# Patient Record
Sex: MALE | Race: WHITE | NOT HISPANIC OR LATINO | Employment: OTHER | URBAN - METROPOLITAN AREA
[De-identification: names, ages, dates, MRNs, and addresses within clinical notes are randomized per-mention and may not be internally consistent; named-entity substitution may affect disease eponyms.]

---

## 2018-01-22 ENCOUNTER — HOSPITAL ENCOUNTER (EMERGENCY)
Facility: MEDICAL CENTER | Age: 68
End: 2018-01-22
Attending: EMERGENCY MEDICINE

## 2018-01-22 ENCOUNTER — APPOINTMENT (OUTPATIENT)
Dept: RADIOLOGY | Facility: MEDICAL CENTER | Age: 68
End: 2018-01-22
Attending: EMERGENCY MEDICINE

## 2018-01-22 VITALS
OXYGEN SATURATION: 95 % | HEART RATE: 86 BPM | HEIGHT: 68 IN | WEIGHT: 182.98 LBS | DIASTOLIC BLOOD PRESSURE: 75 MMHG | SYSTOLIC BLOOD PRESSURE: 154 MMHG | TEMPERATURE: 99.1 F | RESPIRATION RATE: 17 BRPM | BODY MASS INDEX: 27.73 KG/M2

## 2018-01-22 DIAGNOSIS — J40 BRONCHITIS: ICD-10-CM

## 2018-01-22 LAB
ANION GAP SERPL CALC-SCNC: 12 MMOL/L (ref 0–11.9)
BASOPHILS # BLD AUTO: 0.6 % (ref 0–1.8)
BASOPHILS # BLD: 0.05 K/UL (ref 0–0.12)
BUN SERPL-MCNC: 21 MG/DL (ref 8–22)
CALCIUM SERPL-MCNC: 9.3 MG/DL (ref 8.4–10.2)
CHLORIDE SERPL-SCNC: 99 MMOL/L (ref 96–112)
CO2 SERPL-SCNC: 24 MMOL/L (ref 20–33)
CREAT SERPL-MCNC: 1.37 MG/DL (ref 0.5–1.4)
DEPRECATED D DIMER PPP IA-ACNC: <200 NG/ML(D-DU)
EKG IMPRESSION: NORMAL
EOSINOPHIL # BLD AUTO: 0.08 K/UL (ref 0–0.51)
EOSINOPHIL NFR BLD: 0.9 % (ref 0–6.9)
ERYTHROCYTE [DISTWIDTH] IN BLOOD BY AUTOMATED COUNT: 43.5 FL (ref 35.9–50)
GLUCOSE SERPL-MCNC: 179 MG/DL (ref 65–99)
HCT VFR BLD AUTO: 42.4 % (ref 42–52)
HGB BLD-MCNC: 14.4 G/DL (ref 14–18)
IMM GRANULOCYTES # BLD AUTO: 0.02 K/UL (ref 0–0.11)
IMM GRANULOCYTES NFR BLD AUTO: 0.2 % (ref 0–0.9)
LYMPHOCYTES # BLD AUTO: 0.61 K/UL (ref 1–4.8)
LYMPHOCYTES NFR BLD: 6.9 % (ref 22–41)
MCH RBC QN AUTO: 30.8 PG (ref 27–33)
MCHC RBC AUTO-ENTMCNC: 34 G/DL (ref 33.7–35.3)
MCV RBC AUTO: 90.8 FL (ref 81.4–97.8)
MONOCYTES # BLD AUTO: 0.86 K/UL (ref 0–0.85)
MONOCYTES NFR BLD AUTO: 9.8 % (ref 0–13.4)
NEUTROPHILS # BLD AUTO: 7.19 K/UL (ref 1.82–7.42)
NEUTROPHILS NFR BLD: 81.6 % (ref 44–72)
NRBC # BLD AUTO: 0 K/UL
NRBC BLD-RTO: 0 /100 WBC
PLATELET # BLD AUTO: 159 K/UL (ref 164–446)
PMV BLD AUTO: 12.6 FL (ref 9–12.9)
POTASSIUM SERPL-SCNC: 4.4 MMOL/L (ref 3.6–5.5)
RBC # BLD AUTO: 4.67 M/UL (ref 4.7–6.1)
SODIUM SERPL-SCNC: 135 MMOL/L (ref 135–145)
WBC # BLD AUTO: 8.8 K/UL (ref 4.8–10.8)

## 2018-01-22 PROCEDURE — 700102 HCHG RX REV CODE 250 W/ 637 OVERRIDE(OP): Performed by: EMERGENCY MEDICINE

## 2018-01-22 PROCEDURE — 85379 FIBRIN DEGRADATION QUANT: CPT

## 2018-01-22 PROCEDURE — 94640 AIRWAY INHALATION TREATMENT: CPT

## 2018-01-22 PROCEDURE — 99284 EMERGENCY DEPT VISIT MOD MDM: CPT

## 2018-01-22 PROCEDURE — 94760 N-INVAS EAR/PLS OXIMETRY 1: CPT

## 2018-01-22 PROCEDURE — A9270 NON-COVERED ITEM OR SERVICE: HCPCS | Performed by: EMERGENCY MEDICINE

## 2018-01-22 PROCEDURE — 85025 COMPLETE CBC W/AUTO DIFF WBC: CPT

## 2018-01-22 PROCEDURE — 36415 COLL VENOUS BLD VENIPUNCTURE: CPT

## 2018-01-22 PROCEDURE — 700101 HCHG RX REV CODE 250: Performed by: EMERGENCY MEDICINE

## 2018-01-22 PROCEDURE — 71046 X-RAY EXAM CHEST 2 VIEWS: CPT

## 2018-01-22 PROCEDURE — 80048 BASIC METABOLIC PNL TOTAL CA: CPT

## 2018-01-22 PROCEDURE — 93005 ELECTROCARDIOGRAM TRACING: CPT | Performed by: EMERGENCY MEDICINE

## 2018-01-22 RX ORDER — FENOFIBRATE 200 MG/1
200 CAPSULE ORAL
COMMUNITY

## 2018-01-22 RX ORDER — ROSUVASTATIN CALCIUM 10 MG/1
10 TABLET, COATED ORAL EVERY EVENING
COMMUNITY

## 2018-01-22 RX ORDER — DOXYCYCLINE 100 MG/1
100 CAPSULE ORAL DAILY
Qty: 7 CAP | Refills: 0 | Status: SHIPPED | OUTPATIENT
Start: 2018-01-22

## 2018-01-22 RX ORDER — RAMIPRIL 10 MG/1
10 CAPSULE ORAL DAILY
COMMUNITY

## 2018-01-22 RX ORDER — ATENOLOL 100 MG/1
100 TABLET ORAL DAILY
COMMUNITY

## 2018-01-22 RX ORDER — DOXYCYCLINE 100 MG/1
100 TABLET ORAL ONCE
Status: COMPLETED | OUTPATIENT
Start: 2018-01-22 | End: 2018-01-22

## 2018-01-22 RX ORDER — SENNOSIDES 8.6 MG
1300 CAPSULE ORAL 2 TIMES DAILY
COMMUNITY

## 2018-01-22 RX ORDER — M-VIT,TX,IRON,MINS/CALC/FOLIC 27MG-0.4MG
1 TABLET ORAL DAILY
COMMUNITY

## 2018-01-22 RX ORDER — ALBUTEROL SULFATE 90 UG/1
2 AEROSOL, METERED RESPIRATORY (INHALATION) EVERY 4 HOURS PRN
Qty: 1 INHALER | Refills: 0 | Status: SHIPPED | OUTPATIENT
Start: 2018-01-22

## 2018-01-22 RX ADMIN — DOXYCYCLINE 100 MG: 100 TABLET ORAL at 13:07

## 2018-01-22 RX ADMIN — ALBUTEROL SULFATE 2.5 MG: 2.5 SOLUTION RESPIRATORY (INHALATION) at 11:53

## 2018-01-22 ASSESSMENT — PAIN SCALES - GENERAL: PAINLEVEL_OUTOF10: 0

## 2018-01-22 NOTE — ED NOTES
"Med rec updated and complete  Allergie reviewed  Pt had a list of medications, went over list of medications and returned list of medications back to pt.  Pt states \"No antibiotics in the last 30 days\".  Pt states \"Not sure what pharmacy to go too\".    "

## 2018-01-22 NOTE — FLOWSHEET NOTE
01/22/18 1153   Events/Summary/Plan   Events/Summary/Plan Tx given in ER   Interdisciplinary Plan of Care-Goals (Indications)   Obstructive Ventilatory Defect or Pulmonary Disease without Obvious Obstruction Strong Subjective / Objective Improvement   Interdisciplinary Plan of Care-Outcomes    Bronchodilator Outcome Patient at Stable Baseline   Education   Education Yes - Pt. / Family has been Instructed in use of Respiratory Medications and Adverse Reactions   RT Assessment of Delivered Medications   Evaluation of Medication Delivery Daily Yes-- Pt /Family has been Instructed in use of Respiratory Medications and Adverse Reactions   SVN Group   #SVN Performed Yes   Given By: Mouthpiece   Date SVN Last Changed 01/22/18   Date SVN Next Change Due (Q 7 Days) 01/29/18   Respiratory WDL   Respiratory (WDL) X   Chest Exam   Work Of Breathing / Effort Mild   Respiration 16   Heart Rate (Monitored) 77   Breath Sounds   Pre/Post Intervention Pre Intervention Assessment   RUL Breath Sounds Clear   RML Breath Sounds Clear;Diminished   RLL Breath Sounds Diminished   SHANELLE Breath Sounds Clear   LLL Breath Sounds Diminished   Oximetry   #Pulse Oximetry (Single Determination) Yes   Oxygen   Home O2 Use Prior To Admission? No   Pulse Oximetry 94 %   O2 (LPM) 0   O2 (FiO2) 21   O2 Daily Delivery Respiratory  Room Air with O2 Available

## 2018-01-22 NOTE — ED PROVIDER NOTES
"ED Provider Note    CHIEF COMPLAINT(1/4)  Chief Complaint   Patient presents with   • Cough   • Congestion       HPI  Evelio Kimball is a 67 y.o. male who presents with 2 days of tightness in his chest onset 2 days ago.  Onset was insidious with concurrent decreased appetite.  He has had progressive cough with chest pain occurring with his cough.  He has not chest pain other than with cough.  He has not had shortness of breath.  He has been ambulatory for over one mile without any difficulty, increase in chest pressure, sob.  Today he began coughing brown sputum.  He has been driving [place] with recent drive from [place].  He denies any swelling in his extremities.  He denies any f/c.        REVIEW OF SYSTEMS(1/10)  Pertinent positives include: Cough with brown sputum.    Pertinent negatives include: shortness of breath, n/v, d/c, throat pain, rhinorrhea, epistaxis, pain with hernia, abdominal pain.   All other systems are negative.     PAST MEDICAL HISTORY(PFS1,2)  Past Medical History:   Diagnosis Date   • Diabetes (CMS-[facility])     type II       FAMILY HISTORY  None    SOCIAL HISTORY  Social History   Substance Use Topics   • Smoking status: Never Smoker   • Smokeless tobacco: Never Used   • Alcohol use Yes     History   Drug Use No       SURGICAL HISTORY  Past Surgical History:   Procedure Laterality Date   • OTHER      ear drum repair       CURRENT MEDICATIONS  Home Medications    **Home medications have not yet been reviewed for this encounter**         ALLERGIES  No Known Allergies    PHYSICAL EXAM  VITAL SIGNS: /75   Pulse 85   Temp 37.4 °C (99.3 °F)   Resp 16   Ht 1.727 m (5' 8\")   Wt 83 kg (182 lb 15.7 oz)   SpO2 96%   BMI 27.82 kg/m²  Reviewed.  Constitutional: Well developed, Well nourished, nontoxic, NAD  HENT: Normocephalic, atraumatic, bilateral external ears normal, oropharynx moist, No exudates or erythema.   Eyes: PERRL, conjunctiva pink, no scleral icterus.   Cardiovascular: RRR, " NMGR  Respiratory: Right mid rhonchi, no tachypnea or retractions.  Gastrointestinal: Soft, ND, small umbilical hernia which is easily reducible.  Skin: No erythema, no rash.   Genitourinary:  No costovertebral angle tenderness.   Neurologic: Alert & oriented x 3, cranial nerves 3-11 intact by passive exam.  No focal deficit noted.  Psychiatric: Affect normal, Judgment normal, Mood normal.     DIFFERENTIAL DIAGNOSIS:  Pulmonary embolism, influenza, pneumonia.      EKG  Normal sinus rhythm  Grossly normal axis  Mildly elevated QRS, qtc and pr normal  No ST changes  Interpretation nonspecific EKG nonspecific conduction delay    RADIOLOGY/PROCEDURES  Dx-chest-2 Views  1. No active cardiopulmonary abnormalities are identified.    LABORATORY:   Lab Results   Component Value Date/Time    WBC 8.8 01/22/2018 11:19 AM    RBC 4.67 (L) 01/22/2018 11:19 AM    HEMOGLOBIN 14.4 01/22/2018 11:19 AM    HEMATOCRIT 42.4 01/22/2018 11:19 AM    MCV 90.8 01/22/2018 11:19 AM    MCH 30.8 01/22/2018 11:19 AM    MCHC 34.0 01/22/2018 11:19 AM    MPV 12.6 01/22/2018 11:19 AM    NEUTSPOLYS 81.60 (H) 01/22/2018 11:19 AM    LYMPHOCYTES 6.90 (L) 01/22/2018 11:19 AM    MONOCYTES 9.80 01/22/2018 11:19 AM    EOSINOPHILS 0.90 01/22/2018 11:19 AM    BASOPHILS 0.60 01/22/2018 11:19 AM      Lab Results   Component Value Date/Time    SODIUM 135 01/22/2018 11:19 AM    POTASSIUM 4.4 01/22/2018 11:19 AM    CHLORIDE 99 01/22/2018 11:19 AM    CO2 24 01/22/2018 11:19 AM    GLUCOSE 179 (H) 01/22/2018 11:19 AM    BUN 21 01/22/2018 11:19 AM    CREATININE 1.37 01/22/2018 11:19 AM      D dimer- less than 200      COURSE & MEDICAL DECISION MAKING    1050 Evaluated.  Labs, EKG, xray ordered.  Nebulizer treatment ordered.    1245 All data available.  Tightness improved.  Reviewed with patient.  Rx for antibiotics and albuterol discussed.  Return precautions reviewed.  Will discharge with recommendations to f/u in 5 days with pcp.    Review nursing notes and vital signs  a final time 10:38 AM    PLAN:  Discharge home    CONDITION: Stable.    FINAL IMPRESSION  Bronchitis        I personally evaluated the patient myself. And did my own evaluation. I agree with the resident's note except for the following changes.    On my evaluation the patient had rhonchi in the right midlobe.   Patient was given breathing treatments with that bronchitis gone away. Patient does have a productive cough at this point time I think he likely has a bronchitis or early pneumonia. EKG is unremarkable and I do not think this is cardiac in nature. Patient has a negative d-dimer and this was done secondary the patient traveling. Do not think he has a PE.

## 2018-01-22 NOTE — DISCHARGE INSTRUCTIONS
Return if you have more difficulty breathing, chest pain, coughing up blood, a persistent rapid heart rate, or fever they will not go down with Tylenol or ibuprofen.   Bronchitis  Bronchitis is the body's way of reacting to injury and/or infection (inflammation) of the bronchi. Bronchi are the air tubes that extend from the windpipe into the lungs. If the inflammation becomes severe, it may cause shortness of breath.  CAUSES   Inflammation may be caused by:  · A virus.  · Germs (bacteria).  · Dust.  · Allergens.  · Pollutants and many other irritants.  The cells lining the bronchial tree are covered with tiny hairs (cilia). These constantly beat upward, away from the lungs, toward the mouth. This keeps the lungs free of pollutants. When these cells become too irritated and are unable to do their job, mucus begins to develop. This causes the characteristic cough of bronchitis. The cough clears the lungs when the cilia are unable to do their job. Without either of these protective mechanisms, the mucus would settle in the lungs. Then you would develop pneumonia.  Smoking is a common cause of bronchitis and can contribute to pneumonia. Stopping this habit is the single most important thing you can do to help yourself.  TREATMENT   · Your caregiver may prescribe an antibiotic if the cough is caused by bacteria. Also, medicines that open up your airways make it easier to breathe. Your caregiver may also recommend or prescribe an expectorant. It will loosen the mucus to be coughed up. Only take over-the-counter or prescription medicines for pain, discomfort, or fever as directed by your caregiver.  · Removing whatever causes the problem (smoking, for example) is critical to preventing the problem from getting worse.  · Cough suppressants may be prescribed for relief of cough symptoms.  · Inhaled medicines may be prescribed to help with symptoms now and to help prevent problems from returning.  · For those with recurrent  (chronic) bronchitis, there may be a need for steroid medicines.  SEEK IMMEDIATE MEDICAL CARE IF:   · During treatment, you develop more pus-like mucus (purulent sputum).  · You have a fever.  · Your baby is older than 3 months with a rectal temperature of 102° F (38.9° C) or higher.  · Your baby is 3 months old or younger with a rectal temperature of 100.4° F (38° C) or higher.  · You become progressively more ill.  · You have increased difficulty breathing, wheezing, or shortness of breath.  It is necessary to seek immediate medical care if you are elderly or sick from any other disease.  MAKE SURE YOU:   · Understand these instructions.  · Will watch your condition.  · Will get help right away if you are not doing well or get worse.  Document Released: 12/18/2006 Document Revised: 03/11/2013 Document Reviewed: 10/27/2009  [employer]® Patient Information ©2014 [employer], [employer]

## 2018-01-22 NOTE — ED NOTES
"Pt reports feeling \" a bit better\" following resp tx. No needs or concerns at this time. Will continue to monitor.   "

## 2018-01-22 NOTE — ED NOTES
Complaint of productive cough with brown sputum that has turned clear. States burning in lungs and chest wall tightness when coughing. Hx of pneumonia.

## 2018-01-23 ENCOUNTER — PATIENT OUTREACH (OUTPATIENT)
Dept: HEALTH INFORMATION MANAGEMENT | Facility: OTHER | Age: 68
End: 2018-01-23